# Patient Record
Sex: MALE | Race: WHITE | NOT HISPANIC OR LATINO | Employment: FULL TIME | URBAN - METROPOLITAN AREA
[De-identification: names, ages, dates, MRNs, and addresses within clinical notes are randomized per-mention and may not be internally consistent; named-entity substitution may affect disease eponyms.]

---

## 2023-03-22 LAB
CHLAMYDIA TRACH., AMPLIFIED: NEGATIVE
N. GONORRHEA, AMPLIFIED: NEGATIVE
TRICHOMONAS VAGINALIS: NEGATIVE

## 2024-05-14 ENCOUNTER — OFFICE VISIT (OUTPATIENT)
Dept: INFECTIOUS DISEASES | Facility: CLINIC | Age: 34
End: 2024-05-14
Payer: COMMERCIAL

## 2024-05-14 ENCOUNTER — LAB (OUTPATIENT)
Dept: LAB | Facility: LAB | Age: 34
End: 2024-05-14
Payer: COMMERCIAL

## 2024-05-14 VITALS
DIASTOLIC BLOOD PRESSURE: 89 MMHG | WEIGHT: 206 LBS | HEIGHT: 75 IN | SYSTOLIC BLOOD PRESSURE: 134 MMHG | TEMPERATURE: 98.3 F | HEART RATE: 60 BPM | BODY MASS INDEX: 25.61 KG/M2

## 2024-05-14 DIAGNOSIS — R19.7 DIARRHEA, UNSPECIFIED TYPE: Primary | ICD-10-CM

## 2024-05-14 DIAGNOSIS — R19.7 DIARRHEA, UNSPECIFIED TYPE: ICD-10-CM

## 2024-05-14 DIAGNOSIS — K13.70 ORAL MUCOSAL LESION: ICD-10-CM

## 2024-05-14 LAB
ALBUMIN SERPL BCP-MCNC: 4.9 G/DL (ref 3.4–5)
ALP SERPL-CCNC: 51 U/L (ref 33–120)
ALT SERPL W P-5'-P-CCNC: 24 U/L (ref 10–52)
ANION GAP SERPL CALC-SCNC: 13 MMOL/L (ref 10–20)
AST SERPL W P-5'-P-CCNC: 24 U/L (ref 9–39)
BASOPHILS # BLD AUTO: 0.05 X10*3/UL (ref 0–0.1)
BASOPHILS NFR BLD AUTO: 0.8 %
BILIRUB SERPL-MCNC: 0.8 MG/DL (ref 0–1.2)
BUN SERPL-MCNC: 21 MG/DL (ref 6–23)
CALCIUM SERPL-MCNC: 10 MG/DL (ref 8.6–10.3)
CHLORIDE SERPL-SCNC: 103 MMOL/L (ref 98–107)
CO2 SERPL-SCNC: 27 MMOL/L (ref 21–32)
CREAT SERPL-MCNC: 1.28 MG/DL (ref 0.5–1.3)
EGFRCR SERPLBLD CKD-EPI 2021: 75 ML/MIN/1.73M*2
EOSINOPHIL # BLD AUTO: 0.21 X10*3/UL (ref 0–0.7)
EOSINOPHIL NFR BLD AUTO: 3.2 %
ERYTHROCYTE [DISTWIDTH] IN BLOOD BY AUTOMATED COUNT: 12 % (ref 11.5–14.5)
GLUCOSE SERPL-MCNC: 64 MG/DL (ref 74–99)
HCT VFR BLD AUTO: 48.8 % (ref 41–52)
HGB BLD-MCNC: 16.7 G/DL (ref 13.5–17.5)
IMM GRANULOCYTES # BLD AUTO: 0.01 X10*3/UL (ref 0–0.7)
IMM GRANULOCYTES NFR BLD AUTO: 0.2 % (ref 0–0.9)
LYMPHOCYTES # BLD AUTO: 2.11 X10*3/UL (ref 1.2–4.8)
LYMPHOCYTES NFR BLD AUTO: 32.2 %
MCH RBC QN AUTO: 30.5 PG (ref 26–34)
MCHC RBC AUTO-ENTMCNC: 34.2 G/DL (ref 32–36)
MCV RBC AUTO: 89 FL (ref 80–100)
MONOCYTES # BLD AUTO: 0.58 X10*3/UL (ref 0.1–1)
MONOCYTES NFR BLD AUTO: 8.8 %
NEUTROPHILS # BLD AUTO: 3.6 X10*3/UL (ref 1.2–7.7)
NEUTROPHILS NFR BLD AUTO: 54.8 %
NRBC BLD-RTO: 0 /100 WBCS (ref 0–0)
PLATELET # BLD AUTO: 222 X10*3/UL (ref 150–450)
POTASSIUM SERPL-SCNC: 4.2 MMOL/L (ref 3.5–5.3)
PROT SERPL-MCNC: 7.4 G/DL (ref 6.4–8.2)
RBC # BLD AUTO: 5.48 X10*6/UL (ref 4.5–5.9)
SODIUM SERPL-SCNC: 139 MMOL/L (ref 136–145)
WBC # BLD AUTO: 6.6 X10*3/UL (ref 4.4–11.3)

## 2024-05-14 PROCEDURE — 80053 COMPREHEN METABOLIC PANEL: CPT

## 2024-05-14 PROCEDURE — 85025 COMPLETE CBC W/AUTO DIFF WBC: CPT

## 2024-05-14 PROCEDURE — 36415 COLL VENOUS BLD VENIPUNCTURE: CPT

## 2024-05-14 PROCEDURE — 99204 OFFICE O/P NEW MOD 45 MIN: CPT | Performed by: INTERNAL MEDICINE

## 2024-05-14 ASSESSMENT — PAIN SCALES - GENERAL: PAINLEVEL: 5

## 2024-05-14 NOTE — PATIENT INSTRUCTIONS
It was good to meet you.  Today, I do not see any oral lesions in your mouth.  I am referring you to ENT for a biopsy since your oral surgeon did not obtain a sample.  I have ordered general blood work to look at liver, kidney and blood counts.  I have also ordered stool to be tested for parasites.  I cannot use the specimen that you brought in to the clinic.  Go to the lab by your home and they will give you the correct collection supplies.

## 2024-05-14 NOTE — Clinical Note
05/14/24    LE Negrete-CNP  [address]  [place] [address]      Dear LE Ritchie-CNP,    Thank you for referring your patient, Marcelo Del Castillo, to receive care in my office. I have enclosed a summary of the care provided to Marcelo on 05/14/24.    Please contact me with any questions you may have regarding the visit.    Sincerely,         Regla Garcia MD  [address]  [place] [address]    CC: No Recipients

## 2024-05-14 NOTE — Clinical Note
05/14/24    Jose Guadalupe Malone MD  [address] [address]      Dear Dr. Jose Guadalupe Malone MD,    I am writing to confirm that your patient, Marcelo Del Castillo, received care in my office on 05/14/24. I have enclosed a summary of the care provided to Marcelo for your reference.    Please contact me with any questions you may have regarding the visit.    Sincerely,         Regla Garcia MD  [address] [facility]  [facility] [address]    CC: No Recipients

## 2024-05-14 NOTE — PROGRESS NOTES
"Infectious Diseases Clinic Consult Note:    Referred by LE Holley-CNP  Primary MD: Jose Guadalupe Malone MD  Reason for Consult: Diarrhea     History of Current Issue  Marcelo Del Castillo is a 34 y.o. year old male referred for diarrhea from [facility] on 4/5/24.  Review of [facility]/Care Everywhere with no information regarding diarrheal illnesses or evaluation.  He describes a \"combination of ailments ongoing for the last 2 years\".    He describes \"parasitic infection\".  He states he sorts through his stool and has brought in \"lumps of flukes\".  He states that he has never had a diarrheal illness. His stools have always been formed and not more frequent.He came with a bag and a plastic container which leaked fluid all over the clinic floor.  These were discarded and the trash.  Photos on his phone were photographed and entered into the media section.  He describes a chronic mouth infection.  He describes the skin swellings in his soft palate and uvula that \"tastes like blood\".  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  He describes mold exposure and being chronically ill with bloodshot eyes and a red rash for a prolonged.  That improved over time and has since resolved.  He reports recent facial swelling over his left cheek which she describes as \"ringworm [place]\" he was seen by oral maxillofacial surgery who performed a CT of his face.  He states that he was told he had a sinus cyst.  He has followed with this group as well as his primary care physician for this complaint.    3. He reports that his fiancée and he were in the emergency room approximately 1 year ago due to her having elevated heart rate and chills.  She was diagnosed with trichomonas however he was tested negative on multiple occasions.  They were both treated with metronidazole at the time and he states that while on Flagyl, he had \"triple energy and his \"cyst symptoms got better.  He states after " "completing the metronidazole, his left face cheek ringworm returned.   Despite being sexually active only with his fijuliene, he has sought multiple STD testing on multiple occasions all of which have been negative.  He states that when he was treated empirically for trichomonas, he noted redness on his penis but no dysuria or penile discharge.  He had cold sweats and some shivering but no fevers.  He describes \"sunburn\" of his scrotum.  He used Neosporin fungal cream and the \"burning was worse\".    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  It was located in the penile area and treated with Aldara cream.  He also had nitrogen freezing and it never returned.    He had a dog that was 4 years old but recently passed away.  It frequently 8 other animal stool all the time.  It was tested multiple times and had Giardia frequently with bloody stool.  It received metronidazole.  He did not develop diarrheal symptoms or GI symptoms while his dog was ill.    He lives in [place], he uses well water.  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  He lives with his giorgi and her dog.  No travel.  Hobbies include [employer] and camping which she did in [place] 2 years ago.      PAST MEDICAL HISTORY:  No past medical history on file.  PAST SURGICAL HISTORY:  No past surgical history on file.  ALLERGIES:    Not on File    MEDICATIONS:    No current outpatient medications on file.    FAMILY HISTORY:   No family history on file.     SOCIAL HISTORY:       Marital Status:  Tobacco / Smokeless tobacco/ Vaping:   has no history on file for tobacco use.   Alcohol:   Social History     Substance and Sexual Activity   Alcohol Use Not on file      Drug Use:    Social History     Substance and Sexual Activity   Drug Use Not on file      IMMUNIZATIONS:    Immunization History   Administered Date(s) Administered    HPV 9-valent vaccine (GARDASIL 9) 03/03/2022    MMR vaccine, subcutaneous (MMR II) 02/22/2001    " "Pfizer Purple Cap SARS-CoV-2 04/22/2021, 05/13/2021, 12/16/2021    Tdap vaccine, age 7 year and older (BOOSTRIX, ADACEL) 04/14/2021       REVIEW OF SYSTEMS:  Full 10 point ROS performed.  All pertinent positives and negatives as documented in HPI.'    PHYSICAL EXAMINATION:    Vitals:    05/14/24 0944   BP: 134/89   Pulse: 60   Temp: 36.8 °C (98.3 °F)         EXAM:   Constitutional: WD, NAD  Head: AT/NC.  No contusions.   Eyes: PERRLA, EOMI, sclera not injected, anicteric.  No conjunctival injection or lesions  ENT:  Sinuses non-tender; MMM, no oral lesions noted, no thrush. No ulcerations or lumps noted by uvula   NECK: Supple, no LAD  LUNGS: Breathing unlabored.  Clear in all lung fields.  CVS: RRR, S1 & S2 normal.  No murmur, rub, or gallop.  Peripheral pulses intact  ABD: Soft, NT / ND, No HSM  EXT:  No cyanosis, clubbing or edema.  : No abnl findings. NO inguinal LAD  SKIN:  No unusual lesions or rashes. No facial lesions noted in area indicated by patient for site of ringworm.        DATA:    Outside system data reviewed: All labs available in [facility] and [facility] (none) reviewed    Imaging Studies:    CT FACIAL BONES WO IV CONTRAST  11/4/22  Order: [id number]  Impression  No acute abnormality of the facial soft tissues or visualized upper neck.   No significant soft tissue abnormality.    ASSESSMENT / RECOMMENDATIONS:  34-year-old gentleman here with multiple somatic complaints.  Referred for diarrheal illness though he denies any such symptoms.  He is concerned however for a \"liver fluke\" based on photo showing large amounts of stool like material.  He has multiple other complaints consisting of oral lesions as well as reported response to empiric therapy for possible trichomonas exposure years ago.    He has been seen by ENT for complaints of oral ulcers with CT showing a sinus cyst.  He is also been repeatedly checked for sexually transmitted diseases over the years.    Physical examination today is " "unrevealing.  Notably the reported facial rash that he reports as being present is not seen on examination.  There is no oral ulcerations or masses that can be identified today.   examination is also unremarkable.     He brought stool material that he had \"sifting through\" in a plastic bag that had leaked all over our floor.  This was discarded in the trash and the room required special clean up due to potential fecal contamination.    Plan for today was to obtain stool for O&P as well as Giardia antigen.  CBC with differential was ordered to assess for eosinophilia.  Comprehensive metabolic panel was also ordered.  He was referred to ears nose and throat  In order to assess for his mucosal changes that he reports are felt behind his uvula.  They were unable to be visualized.    Suspicion for an underlying, long-term infection is low at this time.    I spent 45 minutes in the professional and overall care of this patient.      Regla Garcia MD                    "

## 2024-05-16 ENCOUNTER — LAB REQUISITION (OUTPATIENT)
Dept: LAB | Facility: HOSPITAL | Age: 34
End: 2024-05-16
Payer: COMMERCIAL

## 2024-05-16 DIAGNOSIS — R19.7 DIARRHEA, UNSPECIFIED: ICD-10-CM

## 2024-05-16 PROCEDURE — 87328 CRYPTOSPORIDIUM AG IA: CPT

## 2024-05-16 PROCEDURE — 87329 GIARDIA AG IA: CPT

## 2024-05-18 LAB
CRYPTOSP AG STL QL IA: NEGATIVE
G LAMBLIA AG STL QL IA: NEGATIVE

## 2024-05-20 LAB — O+P STL MICRO: NEGATIVE

## 2024-05-22 DIAGNOSIS — R69 TRAVEL-RELATED ILLNESS: ICD-10-CM

## 2024-05-22 DIAGNOSIS — R19.7 DIARRHEA, UNSPECIFIED TYPE: Primary | ICD-10-CM

## 2024-06-04 ENCOUNTER — OFFICE VISIT (OUTPATIENT)
Dept: OTOLARYNGOLOGY | Facility: CLINIC | Age: 34
End: 2024-06-04
Payer: COMMERCIAL

## 2024-06-04 VITALS — HEIGHT: 74 IN | WEIGHT: 209 LBS | BODY MASS INDEX: 26.82 KG/M2

## 2024-06-04 DIAGNOSIS — J34.1 MAXILLARY SINUS CYST: Primary | ICD-10-CM

## 2024-06-04 DIAGNOSIS — K13.70 ORAL MUCOSAL LESION: ICD-10-CM

## 2024-06-04 PROCEDURE — 99203 OFFICE O/P NEW LOW 30 MIN: CPT | Performed by: OTOLARYNGOLOGY

## 2024-06-04 ASSESSMENT — PATIENT HEALTH QUESTIONNAIRE - PHQ9
2. FEELING DOWN, DEPRESSED OR HOPELESS: NOT AT ALL
1. LITTLE INTEREST OR PLEASURE IN DOING THINGS: NOT AT ALL
SUM OF ALL RESPONSES TO PHQ9 QUESTIONS 1 & 2: 0

## 2024-06-04 ASSESSMENT — PAIN SCALES - GENERAL: PAINLEVEL: 3

## 2024-06-04 NOTE — PROGRESS NOTES
"Chief Complaint:  Palatal lesions and maxillary sinus cyst.    Referring Provider: Regla Garcia MD    History of Present Illness:    Marcelo Del Castillo is a 34 y.o. male, presenting for evaluation of several otolaryngologic complaints.  His first concern is several palatal lesions that he said he has developed in the last few years.  He is convinced they are HPV related and states that he has had numerous HPV lesions treated in his groin region.  He is concerned about the etiology of this lesions in his mouth.  They are nonpainful.  He has not had the biopsied.  He saw an oral pathologist and an oral surgeon neither of whom were able to perform a biopsy.  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  He complains of intermittent facial numbness and a sensation of swelling.  He is convinced that many of his symptoms may be from a systemic manifestation of \"liver flukes\".  He saw infectious diseases and I reviewed their note.  They did not find any obvious parasitic activity or other infectious disease.  They subsequently referred him to me for evaluation of the oral symptoms.    ?  Review of Systems:    Review of symptoms was negative except for those stated including Cardiopulmonary, Genitourinary, Gastrointestinal, Psychological, Sleep pattern, Endocrine, Eyes, Neurologic, Musculoskeletal, Skin, Hematologic/Lymphatic and Allergic/Immunologic.     Medical History:    I have reviewed the patient's updated past medical history, surgical history, family history, social history, as well as current medications and allergies as of 6/4/2024. Changes to these items have been updated and marked as reviewed in the electronic medical record.    Physical Exam:    Vitals:  height is 1.88 m (6' 2\") and weight is 94.8 kg (209 lb).   General: Patient doing well overall and is in no apparent distress.  Psych: Pleasant affect, and answers questions " appropriately.  Head & Face: Symmetric facial movements  Eyes: Pupils equal, round, reactive.  Extraocular movements intact without gaze restrictions or nystagmus. No epiphora.  Ears:  External auditory canals are normal.  Tympanic membranes are clear.  No middle ear effusion is seen.  All middle ear landmarks are normal.  Nose: Anterior rhinoscopy revealed normal sinonasal mucosa. More posterior areas of the nasal cavity could not be completely examined.  Oral Cavity/Oropharynx:  Without lesions or masses to visual exam.  Small amount of atrophied tonsillar tissue in both fossa that appears nonpathologic  Neck: Supple without lymphadenopathy.  Lungs: Non-labored, and without evidence of stridor.  Cardiac: Pulses are strong, well-perfused.  Extremities: Without gross evidence of clubbing, cyanosis, or edema.  Neuro: Cranial nerves II-XII grossly intact; Intact facial movements.    Assessment:     Marcelo Del Castillo is a 34 y.o. male with reported history of maxillary retention cysts and complaints of facial swelling.  Also primary concern is oral mucosal lesions although I was unable to identify any particular lesions today.    Plan:      Unfortunately I was not able to corroborate the patient's complaints on exam today.  Will order a CT to see if there is any obvious sinonasal manifestation of previous complaint however he would need to see someone outside of the rhinology division to have his palate examined and evaluated.  I am not certain there are actually any HPV related lesions at this time based on my exam.      Matthew Gonsalez MD, M.Eng.   of Otolaryngology - Head & Neck Surgery  Division of Rhinology and Endoscopic Skull Base Surgery  [facility]

## 2024-06-18 ENCOUNTER — HOSPITAL ENCOUNTER (OUTPATIENT)
Dept: RADIOLOGY | Facility: HOSPITAL | Age: 34
Discharge: HOME | End: 2024-06-18
Payer: COMMERCIAL

## 2024-06-18 DIAGNOSIS — J34.1 MAXILLARY SINUS CYST: ICD-10-CM

## 2024-06-18 PROCEDURE — 70486 CT MAXILLOFACIAL W/O DYE: CPT

## 2024-06-18 PROCEDURE — 70486 CT MAXILLOFACIAL W/O DYE: CPT | Performed by: RADIOLOGY

## 2024-07-01 ENCOUNTER — PATIENT MESSAGE (OUTPATIENT)
Dept: OTOLARYNGOLOGY | Facility: HOSPITAL | Age: 34
End: 2024-07-01
Payer: COMMERCIAL

## 2024-07-12 ENCOUNTER — OFFICE VISIT (OUTPATIENT)
Dept: OTOLARYNGOLOGY | Facility: CLINIC | Age: 34
End: 2024-07-12
Payer: COMMERCIAL

## 2024-07-12 ENCOUNTER — APPOINTMENT (OUTPATIENT)
Dept: OTOLARYNGOLOGY | Facility: CLINIC | Age: 34
End: 2024-07-12
Payer: COMMERCIAL

## 2024-07-12 VITALS — BODY MASS INDEX: 25.95 KG/M2 | WEIGHT: 202.1 LBS | TEMPERATURE: 97.7 F

## 2024-07-12 DIAGNOSIS — R22.0 SWELLING, MASS, OR LUMP ON FACE: Primary | ICD-10-CM

## 2024-07-12 DIAGNOSIS — K13.70 ORAL MUCOSAL LESION: ICD-10-CM

## 2024-07-12 PROCEDURE — 42100 BIOPSY ROOF OF MOUTH: CPT | Performed by: OTOLARYNGOLOGY

## 2024-07-12 PROCEDURE — 99214 OFFICE O/P EST MOD 30 MIN: CPT | Performed by: OTOLARYNGOLOGY

## 2024-07-12 PROCEDURE — 88305 TISSUE EXAM BY PATHOLOGIST: CPT

## 2024-07-12 RX ORDER — LIDOCAINE HYDROCHLORIDE AND EPINEPHRINE 10; 10 MG/ML; UG/ML
0.5 INJECTION, SOLUTION INFILTRATION; PERINEURAL ONCE
Status: COMPLETED | OUTPATIENT
Start: 2024-07-12 | End: 2024-07-12

## 2024-07-12 ASSESSMENT — PATIENT HEALTH QUESTIONNAIRE - PHQ9
2. FEELING DOWN, DEPRESSED OR HOPELESS: NOT AT ALL
SUM OF ALL RESPONSES TO PHQ9 QUESTIONS 1 AND 2: 0
1. LITTLE INTEREST OR PLEASURE IN DOING THINGS: NOT AT ALL

## 2024-07-12 NOTE — PROGRESS NOTES
CC: oral discomfort/lesions    Consulted by: Dr. Gonsalez    HPI:  34 year old male who presents with complaints of oral lesions.     Pt has seen numerous physicians including PCP, ENT, oral surgery and infectious disease for these complaints. Numerous rounds of antibiotics and mouth rinses including nystatin. Reports metronidazole only treatment that mildly helped.     Also reports genital HPV and is concerned oral symptoms are related.     Saw Dr. Gonsalez for complaints as well.    Strongly desires biopsy.    Past medical history:  No past medical history on file.    Past surgical history:  No past surgical history on file.    Social history: occasional cigar    Family history:  Reviewed and not relevant to the presenting complaint    Current medications:      Reviewed as noted in current orders     Allergies:                               Reviewed and as noted in current orders    ROS:  All other systems have been reviewed and are negative for complaint. I personally reviewed the intake form that was scanned in today    PE:  CONSTITUTIONAL:  Vitals  -reviewed from intake field, well developed, well nourished.    VOICE:    RESPIRATION:  Breathing comfortably, no stridor.  CV:  No clubbing/cyanosis/edema in hands.  EYES:  EOM Intact, sclera normal.  NEURO:  Alert and oriented times 3, Cranial nerves II-XII intact and symmetric bilaterally.  HEAD AND FACE:  Symmetric facial features,  no masses or lesions, sinuses nontender to palpation.  SALIVARY GLANDS:  Parotid and submandibular glands normal bilaterally.  EARS:  Normal external ears, external auditory canals, and TMs to otoscopy, normal hearing to whispered voice.  NOSE:  External nose midline, anterior rhinoscopy is normal with limited visualization to the anterior aspect of the inferior turbinates.  No lesions noted.    ORAL CAVITY/OROPHARYNX/LIPS:  Normal mucous membranes, normal floor of mouth/tongue/OP, no masses or lesions are noted.  PHARYNGEAL WALLS  AND NASOPHARYNX:  No masses noted. Mucosa appears clean and moist  NECK/LYMPH:  No LAD, no thyroid masses. Trachea palpably midline  SKIN:  Neck skin is without scar or injury  PSYCH:  Alert and oriented with appropriate mood and affect    Radiology reviewed:   I personally reviewed the CT sinus from 6/2024 which shows left sided maxillary sinus partial opacification as well as mild opacification in bilateral ethmoid sinuses without any other findings.     A/P: Oral lesions      The above resident note has been reviewed and confirmed.  Patient was seen and examined with the resident today.  Documentation has been reviewed.              Of the dramatically is just status post successful biopsy      Discussed in detail with patient that this appears to be normal submucosal minor salivary tissue however given his concern over HPV exposure technically a small papilloma could be a possibility therefore biopsy was undertaken today although the patient was reassured that many of his symptoms do not appear to be infectious in nature or parasitic if he has that concern we can send him to infectious disease            At his request we did perform a swab today of his pharynx as he had a specific question about trichomonas and sexual exposures          Local care for the mouth biopsy was given he was referred back to rhinology for his specific concern over his sinus scan findings which in my opinion do not appear to be related but he does describe intermittent facial swelling and pain

## 2024-07-19 LAB
LABORATORY COMMENT REPORT: NORMAL
PATH REPORT.FINAL DX SPEC: NORMAL
PATH REPORT.GROSS SPEC: NORMAL
PATH REPORT.RELEVANT HX SPEC: NORMAL
PATH REPORT.TOTAL CANCER: NORMAL

## 2024-07-24 ENCOUNTER — TELEPHONE (OUTPATIENT)
Dept: OTOLARYNGOLOGY | Facility: HOSPITAL | Age: 34
End: 2024-07-24
Payer: COMMERCIAL

## 2024-07-24 NOTE — TELEPHONE ENCOUNTER
Called patient to discuss results, voicemail was not set up for leaving a message    No answer on home phone left message to call          7/30    Spoke with his father, could not leave message with his cell    Please set up a virtual call even if after OR etc to fit with my and his schedules

## 2024-07-31 ENCOUNTER — TELEPHONE (OUTPATIENT)
Dept: OTOLARYNGOLOGY | Facility: HOSPITAL | Age: 34
End: 2024-07-31
Payer: COMMERCIAL

## 2024-07-31 NOTE — TELEPHONE ENCOUNTER
Patient is scheduled off work tomorrow and can talk to you then.  Otherwise, maybe the two of you can communicate through [employer].  If you cannot reach him tomorrow, he is generally available after 5:15 p.m. weekdays.  (Just FYI)

## 2024-08-13 ENCOUNTER — PATIENT MESSAGE (OUTPATIENT)
Dept: OTOLARYNGOLOGY | Facility: CLINIC | Age: 34
End: 2024-08-13
Payer: COMMERCIAL

## 2024-08-27 ENCOUNTER — APPOINTMENT (OUTPATIENT)
Dept: OTOLARYNGOLOGY | Facility: CLINIC | Age: 34
End: 2024-08-27
Payer: COMMERCIAL

## 2024-08-27 DIAGNOSIS — K13.70 ORAL MUCOSAL LESION: Primary | ICD-10-CM

## 2024-08-27 PROCEDURE — 99212 OFFICE O/P EST SF 10 MIN: CPT | Performed by: OTOLARYNGOLOGY

## 2024-08-27 NOTE — PROGRESS NOTES
This visit was conducted virtually with the patient's permission secondary to the Covid pandemic               Reviewed the report with him    Discussed differential diagnosis including     This started spontaneously after an attic exposure and a covid shot      He will monitor and let us know if something changes      Offered office visit in the future to monitor but he will let us know if anything changes